# Patient Record
Sex: MALE | Race: WHITE | HISPANIC OR LATINO | URBAN - METROPOLITAN AREA
[De-identification: names, ages, dates, MRNs, and addresses within clinical notes are randomized per-mention and may not be internally consistent; named-entity substitution may affect disease eponyms.]

---

## 2019-04-15 ENCOUNTER — APPOINTMENT (OUTPATIENT)
Dept: URBAN - METROPOLITAN AREA CLINIC 282 | Age: 19
Setting detail: DERMATOLOGY
End: 2019-04-15

## 2019-04-15 DIAGNOSIS — L20.89 OTHER ATOPIC DERMATITIS: ICD-10-CM

## 2019-04-15 DIAGNOSIS — L81.9 DISORDER OF PIGMENTATION, UNSPECIFIED: ICD-10-CM

## 2019-04-15 PROBLEM — L20.84 INTRINSIC (ALLERGIC) ECZEMA: Status: ACTIVE | Noted: 2019-04-15

## 2019-04-15 PROCEDURE — OTHER PRESCRIPTION: OTHER

## 2019-04-15 PROCEDURE — OTHER MIPS QUALITY: OTHER

## 2019-04-15 PROCEDURE — OTHER COUNSELING: OTHER

## 2019-04-15 PROCEDURE — 99202 OFFICE O/P NEW SF 15 MIN: CPT

## 2019-04-15 PROCEDURE — OTHER TREATMENT REGIMEN: OTHER

## 2019-04-15 RX ORDER — HYDROCORTISONE 25 MG/G
CREAM TOPICAL
Qty: 1 | Refills: 1 | Status: ERX | COMMUNITY
Start: 2019-04-15

## 2019-04-15 ASSESSMENT — LOCATION DETAILED DESCRIPTION DERM
LOCATION DETAILED: LEFT LATERAL SUPERIOR EYELID
LOCATION DETAILED: RIGHT SUPERIOR MEDIAL BUCCAL CHEEK
LOCATION DETAILED: RIGHT LOWER CUTANEOUS LIP
LOCATION DETAILED: LEFT LATERAL EYEBROW
LOCATION DETAILED: LEFT INFERIOR CENTRAL MALAR CHEEK
LOCATION DETAILED: RIGHT MEDIAL SUPERIOR EYELID
LOCATION DETAILED: RIGHT SUPERIOR CENTRAL MALAR CHEEK

## 2019-04-15 ASSESSMENT — LOCATION SIMPLE DESCRIPTION DERM
LOCATION SIMPLE: RIGHT CHEEK
LOCATION SIMPLE: RIGHT LIP
LOCATION SIMPLE: RIGHT SUPERIOR EYELID
LOCATION SIMPLE: LEFT SUPERIOR EYELID
LOCATION SIMPLE: LEFT EYEBROW
LOCATION SIMPLE: LEFT CHEEK

## 2019-04-15 ASSESSMENT — LOCATION ZONE DERM
LOCATION ZONE: FACE
LOCATION ZONE: LIP
LOCATION ZONE: EYELID

## 2019-04-15 ASSESSMENT — SEVERITY ASSESSMENT: SEVERITY: MILD

## 2019-04-15 NOTE — PROCEDURE: TREATMENT REGIMEN
Otc Regimen: CeraVe foaming cleanser and Cerave moisturizing cream
Initiate Treatment: Hydrocortisone twice a day for one week.\\nAnd moisturizer
Detail Level: Generalized
Samples Given: CeraVe cleanser and CeraVe cream

## 2019-04-16 ENCOUNTER — APPOINTMENT (OUTPATIENT)
Age: 19
Setting detail: DERMATOLOGY
End: 2019-04-24

## 2024-12-10 ENCOUNTER — APPOINTMENT (OUTPATIENT)
Dept: URBAN - METROPOLITAN AREA CLINIC 224 | Age: 24
Setting detail: DERMATOLOGY
End: 2024-12-22

## 2024-12-10 DIAGNOSIS — L30.9 DERMATITIS, UNSPECIFIED: ICD-10-CM

## 2024-12-10 PROCEDURE — 99204 OFFICE O/P NEW MOD 45 MIN: CPT

## 2024-12-10 PROCEDURE — OTHER COUNSELING: OTHER

## 2024-12-10 PROCEDURE — OTHER TREATMENT REGIMEN: OTHER

## 2024-12-10 PROCEDURE — OTHER PRESCRIPTION: OTHER

## 2024-12-10 PROCEDURE — OTHER MIPS QUALITY: OTHER

## 2024-12-10 RX ORDER — MUPIROCIN 20 MG/G
OINTMENT TOPICAL
Qty: 22 | Refills: 0 | Status: ERX | COMMUNITY
Start: 2024-12-10

## 2024-12-10 RX ORDER — METRONIDAZOLE 7.5 MG/G
GEL TOPICAL
Qty: 45 | Refills: 0 | Status: ERX | COMMUNITY
Start: 2024-12-10

## 2024-12-10 RX ORDER — VALACYCLOVIR 1 G/1
TABLET, FILM COATED ORAL
Qty: 6 | Refills: 0 | Status: ERX | COMMUNITY
Start: 2024-12-10

## 2024-12-10 RX ORDER — DOXYCYCLINE HYCLATE 100 MG/1
CAPSULE, GELATIN COATED ORAL
Qty: 30 | Refills: 0 | Status: ERX | COMMUNITY
Start: 2024-12-10

## 2024-12-10 ASSESSMENT — PAIN INTENSITY VAS: HOW INTENSE IS YOUR PAIN 0 BEING NO PAIN, 10 BEING THE MOST SEVERE PAIN POSSIBLE?: 1/10 PAIN

## 2024-12-10 ASSESSMENT — SEVERITY ASSESSMENT: SEVERITY: MODERATE

## 2024-12-10 ASSESSMENT — BSA RASH: BSA RASH: 5

## 2024-12-10 ASSESSMENT — ITCH NUMERIC RATING SCALE: HOW SEVERE IS YOUR ITCHING?: 2

## 2024-12-10 NOTE — PROCEDURE: TREATMENT REGIMEN
Samples Given: Eucerin, CeraVe or Cetaphil
Plan: Follow up x 1 month Rash.
Initiate Treatment: :\\n1. Gentle Skin Care: Lukewarm water, Dove for sensitive skin soap for cleansing and frequent Eucerin, CeraVe or Cetaphil for moisturizing.\\n2. Valacyclovir 1 gram tablet: Take 1 tablet by mouth twice daily for x 3 days.\\n3. Doxycycline hyclate 100 mg capsule: Take 1 capsule by mouth once daily x30 days.\\n4. Metronidazole 0.75 % topical gel: Apply to affected area of face AM/PM after gentle cleansing x1 month.\\n5. Mupirocin 2 % topical ointment: Apply thin layer to affected area of face AM/PM x1 week then discontinue.
Detail Level: Zone

## 2024-12-10 NOTE — HPI: RASH
What Type Of Note Output Would You Prefer (Optional)?: Bullet Format
How Severe Is Your Rash?: moderate
Is This A New Presentation, Or A Follow-Up?: Rash
Additional History: Patient states rash comes and goes and back in August he was sick with the flu and might of triggered the rash but not sure. Used asteroid cream that he does not recall whom gave it to him or if it was store bought which did not work. He took a trip to [facility] in August and rash started once he was there. Rash originally started on his genitals.